# Patient Record
Sex: FEMALE | Race: WHITE | Employment: FULL TIME | URBAN - METROPOLITAN AREA
[De-identification: names, ages, dates, MRNs, and addresses within clinical notes are randomized per-mention and may not be internally consistent; named-entity substitution may affect disease eponyms.]

---

## 2024-11-01 ENCOUNTER — HOSPITAL ENCOUNTER (EMERGENCY)
Facility: CLINIC | Age: 49
Discharge: HOME | End: 2024-11-01
Attending: EMERGENCY MEDICINE
Payer: COMMERCIAL

## 2024-11-01 VITALS
BODY MASS INDEX: 27.99 KG/M2 | SYSTOLIC BLOOD PRESSURE: 126 MMHG | DIASTOLIC BLOOD PRESSURE: 82 MMHG | RESPIRATION RATE: 18 BRPM | TEMPERATURE: 98.2 F | WEIGHT: 168 LBS | OXYGEN SATURATION: 99 % | HEIGHT: 65 IN | HEART RATE: 74 BPM

## 2024-11-01 DIAGNOSIS — L25.9 CONTACT DERMATITIS, UNSPECIFIED CONTACT DERMATITIS TYPE, UNSPECIFIED TRIGGER: Primary | ICD-10-CM

## 2024-11-01 PROCEDURE — 99283 EMERGENCY DEPT VISIT LOW MDM: CPT

## 2024-11-01 PROCEDURE — 6360000002 HC RX W HCPCS

## 2024-11-01 RX ORDER — DEXAMETHASONE 4 MG/1
10 TABLET ORAL ONCE
Status: COMPLETED | OUTPATIENT
Start: 2024-11-01 | End: 2024-11-01

## 2024-11-01 RX ORDER — TRIAMCINOLONE ACETONIDE 1 MG/G
CREAM TOPICAL
Qty: 15 G | Refills: 0 | Status: SHIPPED | OUTPATIENT
Start: 2024-11-01

## 2024-11-01 RX ORDER — DROSPIRENONE 4 MG/1
4 TABLET, FILM COATED ORAL DAILY
COMMUNITY
Start: 2024-10-22

## 2024-11-01 RX ORDER — CLOTRIMAZOLE 1 %
CREAM (GRAM) TOPICAL
Qty: 14 G | Refills: 0 | Status: SHIPPED | OUTPATIENT
Start: 2024-11-01 | End: 2024-11-08

## 2024-11-01 RX ORDER — HYDROXYZINE HYDROCHLORIDE 25 MG/1
25 TABLET, FILM COATED ORAL EVERY 8 HOURS PRN
Qty: 30 TABLET | Refills: 0 | Status: SHIPPED | OUTPATIENT
Start: 2024-11-01 | End: 2024-11-11

## 2024-11-01 RX ADMIN — DEXAMETHASONE 10 MG: 4 TABLET ORAL at 17:07

## 2024-11-01 ASSESSMENT — PAIN - FUNCTIONAL ASSESSMENT: PAIN_FUNCTIONAL_ASSESSMENT: NONE - DENIES PAIN

## 2024-11-01 NOTE — DISCHARGE INSTRUCTIONS
prescriptions, 2 topical medications and 1 oral medication.  Oral medication is for itching, called hydroxyzine.  Do not combine this with Benadryl and do not take if driving or going to work as it can make you drowsy.  Do not combine with alcohol.    Never apply steroid cream to your face, do not use for longer than 2 weeks.    PLEASE RETURN TO THE EMERGENCY DEPARTMENT IMMEDIATELY for worsening symptoms of  pain, spreading of rash, notice any white drainage, increase in redness around any of the sites, or if you develop any concerning symptoms such as: high fever not relieved by acetaminophen (Tylenol) and/or ibuprofen (Motrin / Advil), chills, shortness of breath, chest pain, feeling of your heart fluttering or racing, persistent nausea and/or vomiting, vomiting up blood, blood in your stool, loss of consciousness, numbness, weakness or tingling in the arms or legs or change in color of the extremities, changes in mental status, persistent headache, blurry vision, loss of bladder / bowel control, unable to follow up with your physician, or other any other care or concern.

## 2024-11-01 NOTE — ED PROVIDER NOTES
[facility]  [address] [address]  Phone: [phone]        Pt Name: Bhavya Meek  MRN: [id number]  Birthdate 1975  Date of evaluation: 11/1/24    CHIEFCOMPLAINT       Chief Complaint   Patient presents with    Rash     Patches of rashes on arms, back, legs.        HISTORY OF PRESENT ILLNESS (Location/Symptom, Timing/Onset, Context/Setting, Quality, Duration, Modifying Factors, Severity)      Bhavya Meek is a 49 y.o. female with no pertinent PMH who presents to the ED via private auto with complaint of patches of rash on her arms and legs, started about 3 days ago.  Patient states they are very itchy, concerned about possible exposure to poison ivy.  Patient states her cat came home the other day and was very dirty, she was cleaning him off with a paper towel and believes she may have gotten some poison ivy on her.  No new detergents, fragrances or moisturizers.  No swelling to her face, lips or mouth.  No shortness of breath or wheezing.  No known drug allergies.  No new medications.  Eating and drinking normally.  No fever or chills, chest pain or shortness of breath.  Patient states that she noticed rash on her right arm first and then her left and legs next.  Has been using calamine lotion with moderate relief however concerned that the rash does not appear to be improving.    PAST MEDICAL / SURGICAL / SOCIAL / FAMILY HISTORY     PMH:  has no past medical history on file.  Surgical History:  has no past surgical history on file.  Social History:    Family History: has no family status information on file.    family history is not on file.  Psychiatric History: None    Allergies: Patient has no known allergies.    Home Medications:   Prior to Admission medications    Medication Sig Start Date End Date Taking? Authorizing Provider   Drospirenone (SLYND) 4 MG TABS Take 4 mg by mouth daily 10/22/24  Yes Provider, MD Dann   triamcinolone (KENALOG) 0.1 % cream Apply

## 2024-11-01 NOTE — ED PROVIDER NOTES
[facility]  eMERGENCY dEPARTMENT eNCOUnter   Independent Attestation     Pt Name: Bhavya Meek  MRN: [id number]  Birthdate 1975  Date of evaluation: 11/1/24       Bhavya Meek is a 49 y.o. female who presents with Rash (Patches of rashes on arms, back, legs. )        Based on the medical record, the care appears appropriate. I was personally available for consultation in the Emergency Department.    Jessica Gimenez DO  Attending Emergency  Physician               Jessica Gimenez DO  11/01/24 [id number]

## 2024-11-02 ASSESSMENT — ENCOUNTER SYMPTOMS
ABDOMINAL PAIN: 0
VOMITING: 0
NAUSEA: 0
SHORTNESS OF BREATH: 0